# Patient Record
(demographics unavailable — no encounter records)

---

## 2025-01-16 NOTE — DISCUSSION/SUMMARY
[FreeTextEntry1] : obesity  - Patient to continue Wegovy 1.7 mg once weekly for four weeks, then to start Wegovy 2.4 mg once a week for four weeks. All side effects reviewed. Questions and concerns addressed.  - A detailed discussion took place about the importance of CV risk reduction - The patient understands the importance of incorporating moderate aerobic exercise, 4 times/week for 40 minutes to reduce risk of CV disease. - A detailed discussion of lifestyle modification was done today. Patient understands the importance of a heart healthy diet and incorporating veggies/legumes/fruits/whole grains and limiting processed foods, sugars and carbs in their diet. - patient understands that stress reduction along with good sleep hygiene will help aid in weight loss - Follow up in 6-7weeks  - The patient has a good understanding of the diagnosis, treatment plan and lifestyle modification. she will contact me at the office for any questions with their care or any changes in their health status.  Case discussed with Dr Armaan Molina.

## 2025-01-16 NOTE — HISTORY OF PRESENT ILLNESS
[FreeTextEntry1] : Mr. SHIRA AUGUST  is a 24-year-old male  who has a past medical history significant for morbid obesity who presents via telehealth for a follow up evaluation. Patient feels generally well today. Denies SOB, chest pain, palpitations, dizziness, and syncope.     ================ ================ 8/2024 Obesity BMI 44.19 Wegovy 0.25 mg x 1 dose  -  No side effects, feels well -  decreased appetite, eating smaller portions -  not snacking anymore  ========================== 10/2024 Wegovy 0.5 mg x 5 doses  - Feels well - Occasional nausea in the AM which improves with eating or drinking -  Reports appetite suppression is better on this dose ======================= 12/2024 Wegovy 1 mg x  2 months - Reports nausea first 3 days after injection - Appetite suppression, eating smaller portions - Will order Zofran for nausea ===================== 1/2025 Wegovy 1.7 mg x 1 month - Nausea after injection but much improved with Zofran. - Appetite suppression present same as on 1 mg dose. - Most recent weight of 280 lbs. - Just received second month supply of 1.7 mg, to then increase to 2.4 mg dose given he is doing well.

## 2025-03-21 NOTE — DISCUSSION/SUMMARY
[FreeTextEntry1] : obesity  - Patient to continue Wegovy 2.4 mg once weekly for four weeks.  All side effects reviewed. Questions and concerns addressed.  -Blood work prior to next visit. - A detailed discussion took place about the importance of CV risk reduction - The patient understands the importance of incorporating moderate aerobic exercise, 4 times/week for 40 minutes to reduce risk of CV disease. - A detailed discussion of lifestyle modification was done today. Patient understands the importance of a heart healthy diet and incorporating veggies/legumes/fruits/whole grains and limiting processed foods, sugars and carbs in their diet. - patient understands that stress reduction along with good sleep hygiene will help aid in weight loss - Follow up in 4 weeks  - The patient has a good understanding of the diagnosis, treatment plan and lifestyle modification. she will contact me at the office for any questions with their care or any changes in their health status.  Case discussed with Dr Armaan Molina.

## 2025-03-21 NOTE — HISTORY OF PRESENT ILLNESS
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 Patient feels generally well today. Denies SOB, chest pain, palpitations, dizziness, and syncope.     ================ ================ 8/2024 Obesity BMI 44.19 Wegovy 0.25 mg x 1 dose  -  No side effects, feels well -  decreased appetite, eating smaller portions -  not snacking anymore  ========================== 10/2024 Wegovy 0.5 mg x 5 doses  - Feels well - Occasional nausea in the AM which improves with eating or drinking -  Reports appetite suppression is better on this dose ======================= 12/2024 Wegovy 1 mg x  2 months - Reports nausea first 3 days after injection - Appetite suppression, eating smaller portions - Will order Zofran for nausea ===================== 1/2025 Wegovy 1.7 mg x 1 month - Nausea after injection but much improved with Zofran. - Appetite suppression present same as on 1 mg dose. - Most recent weight of 280 lbs. - Just received second month supply of 1.7 mg, to then increase to 2.4 mg dose given he is doing well. ===================== Wegovy 2.4mg x 5 doses -Nausea on day of injection which resolves -High protein diet including shakes -Walks for exercise -Weight 270

## 2025-05-16 NOTE — DISCUSSION/SUMMARY
[FreeTextEntry1] : obesity  - Patient to continue Wegovy 2.4 mg once weekly for 8 weeks.  All side effects reviewed. Questions and concerns addressed.  -Recently received renewal of medication.  None sent this visit - A detailed discussion took place about the importance of CV risk reduction - The patient understands the importance of incorporating moderate aerobic exercise, 4 times/week for 40 minutes to reduce risk of CV disease. - A detailed discussion of lifestyle modification was done today. Patient understands the importance of a heart healthy diet and incorporating veggies/legumes/fruits/whole grains and limiting processed foods, sugars and carbs in their diet. - patient understands that stress reduction along with good sleep hygiene will help aid in weight loss - Follow up in 8 weeks  - The patient has a good understanding of the diagnosis, treatment plan and lifestyle modification. she will contact me at the office for any questions with their care or any changes in their health status.  Case discussed with Dr Armaan Molina.

## 2025-05-16 NOTE — HISTORY OF PRESENT ILLNESS
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 Patient feels generally well today. Denies SOB, chest pain, palpitations, dizziness, and syncope.   Telehealth visit using 2 way audio. Patient was at home. Provider was in office (or home office). Consent was provided by patient. Only patient and provider in the visit.  ================ ================ 8/2024 Obesity BMI 44.19 Wegovy 0.25 mg x 1 dose  -  No side effects, feels well -  decreased appetite, eating smaller portions -  not snacking anymore  ========================== 10/2024 Wegovy 0.5 mg x 5 doses  - Feels well - Occasional nausea in the AM which improves with eating or drinking -  Reports appetite suppression is better on this dose ======================= 12/2024 Wegovy 1 mg x  2 months - Reports nausea first 3 days after injection - Appetite suppression, eating smaller portions - Will order Zofran for nausea ===================== 1/2025 Wegovy 1.7 mg x 1 month - Nausea after injection but much improved with Zofran. - Appetite suppression present same as on 1 mg dose. - Most recent weight of 280 lbs. - Just received second month supply of 1.7 mg, to then increase to 2.4 mg dose given he is doing well. ===================== Wegovy 2.4mg x 5 doses -Nausea on day of injection which resolves -High protein diet including shakes -Walks for exercise -Weight 270 ================================  Wegovy 2.4 -Denies side effects. No longer experiencing nausea on day of injection -Continues to experience appetite suppression -Exercise: walking -Bloodwork improved (A1C and Lipid panel) -Weight: 260 lbs.  At this time the patient would like to lose 20-30 more lbs

## 2025-07-11 NOTE — HISTORY OF PRESENT ILLNESS
[FreeTextEntry1] : Dear Nikhil,   I had the pleasure of seeing your patient SHIRA AUGUST for Cardiometabolic evaluation.   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 - Discussed with bariatric surgery colleagues (prior to initiation)  who are in agreement that GLP1RA is the best course of therapy - Discussed risks/benefits of GLP1RA and strategy for very gradual discontinuation of medication   ----------------- ----------------- 7-2025 CC: Heart Issues - Patient reports no chest pain, no localization to the sternum, no radiation to the neck/jaw, no alleviating nor worsening precipitants to CP, no assoc symptoms to CP - Patient notes no associated SOB/Palps/Leg swelling - Reports No associated F/C/N/V/Headaches - Reports Normal Exercise Tolerance - Reports no medication changes - Reports normal mood/quality of life - Reports no associated midnight awakenings from cP - Reports no diet changes - Reports no associated body aches- Reports no recent colds/viruses- Recent labs/imaging reviewed- Relevant Family history reviewed Mother/Father - CVRisk Assessment for 10 Year ACC/AHA Pooled Risk Cohort Equation places this person at < 7.5% Risk of ASCVD  -----------Telehealth visit today on [place]. Telehealth was done using 2 way audiovisual. Patient was at home. Both patient and provider in [facility].  Provider was in office. Consent was provided by patient. Only patient and provider in the visit.Patient advised that TEB appointment is treated the same as how an in office appt would be treated with regard to operations and [facility] invoicing On Wegovy 2.4 mg - down 50 lbs  Switch to Zepbound 5mg due to lack of efficacy

## 2025-07-11 NOTE — HISTORY OF PRESENT ILLNESS
[FreeTextEntry1] : Dear Nikhil,   I had the pleasure of seeing your patient SHIRA AUGUST for Cardiometabolic evaluation.   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 - Discussed with bariatric surgery colleagues (prior to initiation)  who are in agreement that GLP1RA is the best course of therapy - Discussed risks/benefits of GLP1RA and strategy for very gradual discontinuation of medication   ----------------- ----------------- 7-2025 CC: Heart Issues - Patient reports no chest pain, no localization to the sternum, no radiation to the neck/jaw, no alleviating nor worsening precipitants to CP, no assoc symptoms to CP - Patient notes no associated SOB/Palps/Leg swelling - Reports No associated F/C/N/V/Headaches - Reports Normal Exercise Tolerance - Reports no medication changes - Reports normal mood/quality of life - Reports no associated midnight awakenings from cP - Reports no diet changes - Reports no associated body aches- Reports no recent colds/viruses- Recent labs/imaging reviewed- Relevant Family history reviewed Mother/Father - CVRisk Assessment for 10 Year ACC/AHA Pooled Risk Cohort Equation places this person at < 7.5% Risk of ASCVD  -----------Telehealth visit today on [facility]. Telehealth was done using 2 way audiovisual. Patient was at home. Both patient and provider in [facility].  Provider was in office. Consent was provided by patient. Only patient and provider in the visit.Patient advised that TEB appointment is treated the same as how an in office appt would be treated with regard to operations and [facility] invoicing On Wegovy 2.4 mg - down 50 lbs  Switch to Zepbound 5mg due to lack of efficacy

## 2025-07-11 NOTE — DISCUSSION/SUMMARY
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 - Discussed with bariatric surgery colleagues (prior to initiation)  who are in agreement that GLP1RA is the best course of therapy - Discussed risks/benefits of GLP1RA and strategy for very gradual discontinuation of medication    Nikhil, kind thanks for the referral.   Armaan Molina MD [facility] Director, Preventive Cardiology & Lipidology [facility] [place]

## 2025-07-11 NOTE — DISCUSSION/SUMMARY
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 - Discussed with bariatric surgery colleagues (prior to initiation)  who are in agreement that GLP1RA is the best course of therapy - Discussed risks/benefits of GLP1RA and strategy for very gradual discontinuation of medication    Nikhil, kind thanks for the referral.   Armaan Molina MD [facility] Director, Preventive Cardiology & Lipidology [facility] [place]

## 2025-07-15 NOTE — PHYSICAL EXAM
[Well Developed] : well developed [Well Nourished] : well nourished [No Acute Distress] : no acute distress [Normal Conjunctiva] : normal conjunctiva [Normal Venous Pressure] : normal venous pressure [No Carotid Bruit] : no carotid bruit [Normal S1, S2] : normal S1, S2 [No Murmur] : no murmur [No Rub] : no rub [No Gallop] : no gallop [Clear Lung Fields] : clear lung fields [Good Air Entry] : good air entry [No Respiratory Distress] : no respiratory distress  [Soft] : abdomen soft [Non Tender] : non-tender [No Masses/organomegaly] : no masses/organomegaly [Normal Bowel Sounds] : normal bowel sounds [Normal Gait] : normal gait [No Edema] : no edema [No Cyanosis] : no cyanosis [No Clubbing] : no clubbing [No Varicosities] : no varicosities [No Rash] : no rash [No Skin Lesions] : no skin lesions [Moves all extremities] : moves all extremities [No Focal Deficits] : no focal deficits [Normal Speech] : normal speech [Alert and Oriented] : alert and oriented [Normal memory] : normal memory [de-identified] : cerumen right ear  [de-identified] : normal genitals  [de-identified] : nevi

## 2025-07-15 NOTE — HISTORY OF PRESENT ILLNESS
[FreeTextEntry1] : This is a 25 year y/o male with a PMHx of Anxiety presents today for his annual wellness exam. On Wegovy lost about  50 pounds, will be switching to Zepbound. Patient feels well today, offers no complaints.  Denies chest pain, palpitations, diaphoresis, vision changes, HA, dizziness, syncope, cough, wheezing, SOB/GOLDEN, edema, fever, chills, infection.

## 2025-07-15 NOTE — REASON FOR VISIT
Oriented - self; Oriented - place; Oriented - time [FreeTextEntry1] : Here for annual wellness visit